# Patient Record
Sex: FEMALE | Race: WHITE | URBAN - NONMETROPOLITAN AREA
[De-identification: names, ages, dates, MRNs, and addresses within clinical notes are randomized per-mention and may not be internally consistent; named-entity substitution may affect disease eponyms.]

---

## 2017-07-26 ENCOUNTER — OFFICE VISIT (OUTPATIENT)
Dept: ONCOLOGY | Age: 32
End: 2017-07-26
Payer: COMMERCIAL

## 2017-07-26 ENCOUNTER — HOSPITAL ENCOUNTER (OUTPATIENT)
Dept: INFUSION THERAPY | Age: 32
Discharge: HOME | End: 2017-07-26
Payer: COMMERCIAL

## 2017-07-26 VITALS
SYSTOLIC BLOOD PRESSURE: 107 MMHG | RESPIRATION RATE: 16 BRPM | HEART RATE: 74 BPM | WEIGHT: 142.6 LBS | HEIGHT: 61 IN | BODY MASS INDEX: 26.92 KG/M2 | TEMPERATURE: 98.2 F | DIASTOLIC BLOOD PRESSURE: 64 MMHG | OXYGEN SATURATION: 100 %

## 2017-07-26 DIAGNOSIS — O99.013 ANEMIA AFFECTING PREGNANCY IN THIRD TRIMESTER: Primary | ICD-10-CM

## 2017-07-26 PROCEDURE — G8427 DOCREV CUR MEDS BY ELIG CLIN: HCPCS | Performed by: INTERNAL MEDICINE

## 2017-07-26 PROCEDURE — G8419 CALC BMI OUT NRM PARAM NOF/U: HCPCS | Performed by: INTERNAL MEDICINE

## 2017-07-26 PROCEDURE — 99243 OFF/OP CNSLTJ NEW/EST LOW 30: CPT | Performed by: INTERNAL MEDICINE

## 2017-07-26 PROCEDURE — 99211 OFF/OP EST MAY X REQ PHY/QHP: CPT

## 2017-07-26 PROCEDURE — 1036F TOBACCO NON-USER: CPT | Performed by: INTERNAL MEDICINE

## 2017-07-26 RX ORDER — MESALAMINE 1.2 G/1
TABLET, DELAYED RELEASE ORAL
COMMUNITY
Start: 2017-04-21

## 2017-09-13 ENCOUNTER — HOSPITAL ENCOUNTER (OUTPATIENT)
Dept: INFUSION THERAPY | Age: 32
Discharge: HOME | End: 2017-09-13
Payer: COMMERCIAL

## 2017-09-13 ENCOUNTER — OFFICE VISIT (OUTPATIENT)
Dept: ONCOLOGY | Age: 32
End: 2017-09-13
Payer: COMMERCIAL

## 2017-09-13 VITALS
SYSTOLIC BLOOD PRESSURE: 94 MMHG | DIASTOLIC BLOOD PRESSURE: 64 MMHG | RESPIRATION RATE: 16 BRPM | TEMPERATURE: 97.8 F | BODY MASS INDEX: 23.56 KG/M2 | WEIGHT: 124.8 LBS | HEIGHT: 61 IN | OXYGEN SATURATION: 98 % | HEART RATE: 73 BPM

## 2017-09-13 DIAGNOSIS — O99.013 ANEMIA AFFECTING PREGNANCY IN THIRD TRIMESTER: ICD-10-CM

## 2017-09-13 DIAGNOSIS — D50.9 IRON DEFICIENCY ANEMIA, UNSPECIFIED IRON DEFICIENCY ANEMIA TYPE: Primary | ICD-10-CM

## 2017-09-13 LAB
BASINOPHIL, AUTOMATED: 0 % (ref 0–12)
EOSINOPHILS RELATIVE PERCENT: 11 % (ref 0–12)
FERRITIN: 9 NG/ML (ref 10–291)
HCT VFR BLD CALC: 34.3 % (ref 37–47)
HEMOGLOBIN: 11.1 GM/DL (ref 12–16)
LYMPHOCYTES # BLD: 23 % (ref 15–47)
MCH RBC QN AUTO: 25.6 PG (ref 27–31)
MCHC RBC AUTO-ENTMCNC: 32.4 GM/DL (ref 33–37)
MCV RBC AUTO: 79 FL (ref 81–99)
MONOCYTES: 9 % (ref 0–12)
PDW BLD-RTO: 15.2 % (ref 11.5–14.5)
PLATELET # BLD: 333 THOU/MM3 (ref 130–400)
PMV BLD AUTO: 5.9 MCM (ref 7.4–10.4)
RBC # BLD: 4.35 MILL/MM3 (ref 4.2–5.4)
SEG NEUTROPHILS: 57 % (ref 43–75)
WBC # BLD: 6.3 THOU/MM3 (ref 4.8–10.8)

## 2017-09-13 PROCEDURE — 36415 COLL VENOUS BLD VENIPUNCTURE: CPT

## 2017-09-13 PROCEDURE — G8427 DOCREV CUR MEDS BY ELIG CLIN: HCPCS | Performed by: INTERNAL MEDICINE

## 2017-09-13 PROCEDURE — G8420 CALC BMI NORM PARAMETERS: HCPCS | Performed by: INTERNAL MEDICINE

## 2017-09-13 PROCEDURE — 1036F TOBACCO NON-USER: CPT | Performed by: INTERNAL MEDICINE

## 2017-09-13 PROCEDURE — 82728 ASSAY OF FERRITIN: CPT

## 2017-09-13 PROCEDURE — 99213 OFFICE O/P EST LOW 20 MIN: CPT | Performed by: INTERNAL MEDICINE

## 2017-09-13 PROCEDURE — 99211 OFF/OP EST MAY X REQ PHY/QHP: CPT

## 2017-09-13 PROCEDURE — 85025 COMPLETE CBC W/AUTO DIFF WBC: CPT

## 2017-09-13 RX ORDER — LANOLIN ALCOHOL/MO/W.PET/CERES
325 CREAM (GRAM) TOPICAL
COMMUNITY

## 2017-09-13 NOTE — MR AVS SNAPSHOT
After Visit Summary             Josseline Ling   2017 11:00 AM   Office Visit    Description:  Female : 1985   Provider:  Melecio Soulier, MD   Department:  [id number] [address] and Future Appointments         Below is a list of your follow-up and future appointments. This may not be a complete list as you may have made appointments directly with providers that we are not aware of or your providers may have made some for you. Please call your providers to confirm appointments. It is important to keep your appointments. Please bring your current insurance card, photo ID, co-pay, and all medication bottles to your appointment. If self-pay, payment is expected at the time of service.            Information from Your Visit        Department     Name Address Phone Fax    [phone] of [address] [place] [address] [phone] [address] [phone]      Vital Signs     Blood Pressure Pulse Temperature Respirations Height Weight    94/64 (Site: Left Arm, Position: Sitting, Cuff Size: Medium Adult) 73 97.8 °F (36.6 °C) (Oral) 16 5' 1.02\" (1.55 m) 124 lb 12.8 oz (56.6 kg)    Oxygen Saturation Body Mass Index Smoking Status             98% 23.56 kg/m2 Never Smoker         Instructions    No schedule follow up            Medications and Orders      Your Current Medications Are              ferrous sulfate 325 (65 Fe) MG EC tablet Take 325 mg by mouth daily (with breakfast)    LIALDA 1.2 g EC tablet     Prenatal Vit-DSS-Fe Cbn-FA (PRENATAL ADVANTAGE PO) Take 1 tablet by mouth daily      Allergies           No Known Allergies         Additional Information        Basic Information     Date Of Birth Sex Race Ethnicity Preferred Language Preferred Written Language    1985 Female White Non-/Non  English English      Preventive Care        Date Due    HIV screening is recommended for all people regardless of risk factors

## 2017-10-09 NOTE — PROGRESS NOTES
PMH, SH, and FH:  I reviewed the patients medication list and allergy list as noted on the electronic medical record. The PMH, SH and FH were also reviewed as noted on the EMR. Review of Systems  Constitutional: Fatigue. Weight gain. HENT: Hearing and swallowing normal.    Eyes: No vision changes. Respiratory: Shortness of breath. Cardiovascular: No palpitations. No chest pain. Gastrointestinal: Nausea, abdominal pain, constipation. Genitourinary: Frequency. Musculoskeletal: No joint pain. Stable gait. Skin: No rash. Denies itching. Neurological: Intermittent dizziness. Hematological: No abnormal bleeding or bruising. Psychiatric/Behavioral: Denies anxiety or depression. Objective:   Physical Exam  Vitals:    09/13/17 1124   BP: 94/64   Pulse: 73   Resp: 16   Temp: 97.8 °F (36.6 °C)   SpO2: 98%   Vitals reviewed and are stable. Constitutional: Well-developed and well-nourished. No acute distress. HENT: Normocephalic and atraumatic. Eyes: Pupils are equal and reactive. No scleral icterus. Neck: Overall appearance is symmetrical. No identifiable masses. Pulmonary: Effort normal. No respiratory distress. Cardiovascular: RRR. No edema in any of the four extremities. Abdominal: Currently pregnant  Neurological: Alert and oriented to person, place, and time. Judgment and thought content normal.  Skin: Skin is warm and dry. No rash. Psychiatric: Mood and affect appropriate for the clinical situation. Behavior is normal.        Data Analysis:    Hematology 9/13/2017   WBC 6.3   RBC 4.35   HGB 11.1 (L)   HCT 34.3 (L)   MCV 79 (L)   RDW 15.2 (H)      Ferritin 9 (L)     Assessment:   1. Iron deficiency associated with pregnancy. 2.  Anemia secondary to iron deficiency  3. Ulcerative Colitis. Recommendations:   1. Continue oral iron supplementation. 2.  Monitor hemoglobin/hematocrit and for any signs of blood loss.   3.  Follow up with Dr. Angel Gonzalez for follow up of anemia. 4.  Return to hematology clinic as needed.      Socrates Mendiola M.D.  [place] [address] [address], [address] [facility], [address] [place] [address], [address] [place] [address]